# Patient Record
(demographics unavailable — no encounter records)

---

## 2024-10-09 NOTE — HISTORY OF PRESENT ILLNESS
[FreeTextEntry1] : he has no chest pain He has no shortness of breath He has no palpitations He has no syncope He is neurologically intact He has slight edema. Compression stockings He has no GI symptoms

## 2024-10-09 NOTE — REASON FOR VISIT
[Cardiac Failure] : cardiac failure [Arrhythmia/ECG Abnorrmalities] : arrhythmia/ECG abnormalities [Structural Heart and Valve Disease] : structural heart and valve disease [FreeTextEntry1] : I saw this 63-year-old man in f/u cardiac consultation on  10/06/24 He first presented in congestive heart failure and atrial fibrillation while in [place] last year. He subsequently presented to the emergency room at [facility] [place],  in rapid A-fib and congestive heart failure.  He was diuresed and underwent CORDELIA cardioversion and remains today in sinus rhythm. He is anticoagulated with warfarin but will switch to Eliquis. Despite significant medication for unloading he still has elevated blood pressure. Clinically he has vastly improved and a recent repeat echo showed an EF of  54%.  His breathing is better and he has no edema of his legs.  However on multiple medications his blood pressure is still borderline elevated. I will add Procardia 30 mg daily but if this results in more leg swelling I will have to change that. He has put on 20 pounds in weight and his blood pressure at times is quite high so I will increase his Aldactone to twice daily and his nifedipine to twice daily.

## 2024-10-09 NOTE — DISCUSSION/SUMMARY
[FreeTextEntry1] : Continue current unloading agents, hydralazine 100mg  3 times daily, isosorbide 20 mg BID , Aldactone 25 BID, switch warfarin to Eliquis, added Lasix 20 mg daily, I will arrange for a cardiac CTA to evaluate whether he has coronary disease or not.  An angiogram done 10 years ago showed no coronary disease. CTA showed no calcium and no plaque. Added Procardia 30 mg daily for his blood pressure, and increased to BID [EKG obtained to assist in diagnosis and management of assessed problem(s)] : EKG obtained to assist in diagnosis and management of assessed problem(s)

## 2024-10-09 NOTE — PHYSICAL EXAM
[Well Developed] : well developed [Well Nourished] : well nourished [No Acute Distress] : no acute distress [Normal Conjunctiva] : normal conjunctiva [Normal Venous Pressure] : normal venous pressure [No Carotid Bruit] : no carotid bruit [Normal S1, S2] : normal S1, S2 [No Murmur] : no murmur [No Rub] : no rub [No Gallop] : no gallop [Clear Lung Fields] : clear lung fields [Good Air Entry] : good air entry [No Respiratory Distress] : no respiratory distress  [Soft] : abdomen soft [Non Tender] : non-tender [No Masses/organomegaly] : no masses/organomegaly [Normal Bowel Sounds] : normal bowel sounds [Normal Gait] : normal gait [No Edema] : no edema [No Cyanosis] : no cyanosis [No Clubbing] : no clubbing [No Varicosities] : no varicosities [No Rash] : no rash [No Skin Lesions] : no skin lesions [Normal] : moves all extremities, no focal deficits, normal speech [Moves all extremities] : moves all extremities [No Focal Deficits] : no focal deficits [Normal Speech] : normal speech [Alert and Oriented] : alert and oriented [Normal memory] : normal memory [de-identified] : some pedal edema

## 2025-05-21 NOTE — PHYSICAL EXAM
[Well Developed] : well developed [Well Nourished] : well nourished [No Acute Distress] : no acute distress [Normal Conjunctiva] : normal conjunctiva [Normal Venous Pressure] : normal venous pressure [No Carotid Bruit] : no carotid bruit [Normal S1, S2] : normal S1, S2 [No Murmur] : no murmur [No Rub] : no rub [No Gallop] : no gallop [Clear Lung Fields] : clear lung fields [Good Air Entry] : good air entry [No Respiratory Distress] : no respiratory distress  [Soft] : abdomen soft [Non Tender] : non-tender [No Masses/organomegaly] : no masses/organomegaly [Normal Bowel Sounds] : normal bowel sounds [Normal Gait] : normal gait [No Edema] : no edema [No Cyanosis] : no cyanosis [No Clubbing] : no clubbing [No Varicosities] : no varicosities [No Rash] : no rash [No Skin Lesions] : no skin lesions [Normal] : moves all extremities, no focal deficits, normal speech [Moves all extremities] : moves all extremities [No Focal Deficits] : no focal deficits [Normal Speech] : normal speech [Alert and Oriented] : alert and oriented [Normal memory] : normal memory [de-identified] : some pedal edema

## 2025-05-21 NOTE — REASON FOR VISIT
[Cardiac Failure] : cardiac failure [Arrhythmia/ECG Abnorrmalities] : arrhythmia/ECG abnormalities [Structural Heart and Valve Disease] : structural heart and valve disease [FreeTextEntry1] : I saw this 63-year-old man in f/u cardiac consultation on  05/21/25 He first presented in congestive heart failure and atrial fibrillation while in [place] last year. He subsequently presented to the emergency room at [facility] [place],  in rapid A-fib and congestive heart failure.  He was diuresed and underwent CORDELIA cardioversion and remains today in sinus rhythm. He is anticoagulated with warfarin but will switch to Eliquis. Despite significant medication for unloading he still has elevated blood pressure. Clinically he has vastly improved and a recent repeat echo showed an EF of  54%.  His breathing is better and he has no edema of his legs.  However on multiple medications his blood pressure is still borderline elevated. I will add Procardia 30 mg daily but if this results in more leg swelling I will have to change that. He has put on 20 pounds in weight and his blood pressure at times is quite high so I will increase his Aldactone to twice daily and his nifedipine to twice daily.

## 2025-05-21 NOTE — DISCUSSION/SUMMARY
[EKG obtained to assist in diagnosis and management of assessed problem(s)] : EKG obtained to assist in diagnosis and management of assessed problem(s) [FreeTextEntry1] : Continue current unloading agents, hydralazine 100mg  3 times daily, isosorbide 20 mg BID , Aldactone 25 BID, switch warfarin to Eliquis, added Lasix 20 mg daily, I will arrange for a cardiac CTA to evaluate whether he has coronary disease or not.  An angiogram done 10 years ago showed no coronary disease. CTA showed no calcium and no plaque. Added Procardia 30 mg daily for his blood pressure, and increased to BID His EKG today was quite abnormal with deep T wave inversions in every lead.  The echo showed signs of possibly apical amyloid due to his abnormal strain but a normal EF and a very large left atrium.  I will get blood test to screen for amyloid and he will come back in a month.  The neck step may be a cardiac MRI.